# Patient Record
Sex: MALE | Race: WHITE | Employment: OTHER | URBAN - METROPOLITAN AREA
[De-identification: names, ages, dates, MRNs, and addresses within clinical notes are randomized per-mention and may not be internally consistent; named-entity substitution may affect disease eponyms.]

---

## 2017-01-12 ENCOUNTER — OFFICE VISIT (OUTPATIENT)
Dept: SURGERY | Age: 47
End: 2017-01-12

## 2017-01-12 VITALS
HEART RATE: 94 BPM | BODY MASS INDEX: 41.75 KG/M2 | DIASTOLIC BLOOD PRESSURE: 76 MMHG | RESPIRATION RATE: 18 BRPM | WEIGHT: 315 LBS | OXYGEN SATURATION: 97 % | SYSTOLIC BLOOD PRESSURE: 118 MMHG | TEMPERATURE: 98.4 F | HEIGHT: 73 IN

## 2017-01-12 DIAGNOSIS — R63.5 WEIGHT GAIN: ICD-10-CM

## 2017-01-12 DIAGNOSIS — E66.01 MORBID OBESITY WITH BMI OF 40.0-44.9, ADULT (HCC): Primary | ICD-10-CM

## 2017-01-12 RX ORDER — OMEPRAZOLE 40 MG/1
40 CAPSULE, DELAYED RELEASE ORAL DAILY
COMMUNITY

## 2017-01-12 RX ORDER — TRAMADOL HYDROCHLORIDE 50 MG/1
50 TABLET ORAL
COMMUNITY

## 2017-01-12 NOTE — PROGRESS NOTES
Chief Complaint   Patient presents with    Surgical Follow-up     4 years s/p sleeve gastrectomy by Dr Ronald Ya. down 58.5 pounds, gained  pounds. Patient is 4 years status post Sleeve gastrectomy for treatment of morbid obesity. Presents today with weigh gain and needing follow up bariatric care. He has not been in follow up for several years. He says he met his goal for surgery which was to have his hips replaced. He reports eating has \"not been great\" and the Holidays \"didn't help\". He has been snacking and eating cookies and \"whatever is quick\". He has resumed fast food and his portions are larger than they were. He is good about his vitamins. He recently started having a shake for breakfast vs \"grabbing a cookie\". Reflux better with Omeprazole   Has had hips replaced and having issues with right hip   Nereida 270 lbs (1st year post op)  Current weight since Holidays   Usually around 300 lbs        Co-Morbid(s)     Resolved      Was anti coagulation initiated for presumed / confirmed DVT/PE? NO    Was an incisional hernia noted on exam?       NO      COMORBIDITY     SLEEP APNEA                 NO        GERD  (req.meds)           YES  HYPERLIPIDEMIA           NO  HYPERTENSION             YES       IF YES, # OF HTN MEDICATIONS 1  DIABETES                        NO      IF YES, 0 NON-INSULIN   0 INSULIN       Visit Vitals    /76    Pulse 94    Temp 98.4 °F (36.9 °C)    Resp 18    Ht 6' 1\" (1.854 m)    Wt 318 lb 8 oz (144.5 kg)    SpO2 97%    BMI 42.02 kg/m2     A + O x 3  Chest  CTA  COR  RRR  ABD Soft, well healed lap sites, obese with grade II pannus, NT/ND, +BS, no masses or hernias. EXT No edema; ambulating independently      ICD-10-CM ICD-9-CM    1. Morbid obesity with BMI of 40.0-44.9, adult ([address] E66.01 278.01     Z68.41 V85.41    2.  Weight gain R63.5 783.1      4 years s/p Sleeve gastrectomy for treatment of morbid obesity    Almost 50 lbs weight regain from nereida weight Having trouble with right hip and weight gain contributing to pain   Reviewed vitamins and diet   Referred to RD for support / diet education   Reassured vitamins do not cause weight gain   Add vit D 5000 iu daily to regimen   Follow up in 3 months   Continue acid suppression for reflux and reduce portions   Adama Maldonado verbalized understanding and questions were answered to the best of my knowledge and ability. Diet  educational materials were provided. 16 minutes spent in face to face with Mariaa Mckay > 50% counseling.

## 2017-01-12 NOTE — MR AVS SNAPSHOT
Visit Information Date & Time Provider Department Dept. Phone Encounter #  
 1/12/2017 10:20 AM Ivet Turcios NP [facility] [address] [phone] [id number] Your Appointments 3/9/2017  9:20 AM  
ESTABLISHED PATIENT with Ivet Turcios NP  
[facility] [address] ([address]) Appt Note: EP 2 month F/U $0CP $0PB  
 [address] [place] [address] [facility] [id number]  
[address] [facility] Upcoming Health Maintenance Date Due DTaP/Tdap/Td series (1 - Tdap) 9/10/1989 INFLUENZA AGE 9 TO ADULT 8/1/2016 Allergies as of 1/12/2017  Review Complete On: 4/11/2013 By: Geraldine Vuong, RT Severity Noted Reaction Type Reactions Phenobarbital  08/15/2012   Side Effect Other (comments) Made him crazy Current Immunizations  Never Reviewed No immunizations on file. Not reviewed this visit Vitals BP Pulse Temp Resp Height(growth percentile) Weight(growth percentile) 118/76 94 98.4 °F (36.9 °C) 18 6' 1\" (1.854 m) 318 lb 8 oz (144.5 kg) SpO2 BMI Smoking Status 97% 42.02 kg/m2 Former Smoker Vitals History BMI and BSA Data Body Mass Index Body Surface Area 42.02 kg/m 2 2.73 m 2 Preferred Pharmacy Pharmacy Name Phone [id number] - [address] [phone] Your Updated Medication List  
  
   
This list is accurate as of: 1/12/17 11:34 AM.  Always use your most recent med list.  
  
  
  
  
 DIOVAN -12.5 mg per tablet Generic drug:  valsartan-hydroCHLOROthiazide Take 1 Tab by mouth daily. loratadine 10 mg tablet Commonly known as:  Napolean Cherokee Take 10 mg by mouth daily as needed. PriLOSEC 40 mg capsule Generic drug:  omeprazole Take 40 mg by mouth daily. SYNTHROID 125 mcg tablet Generic drug:  levothyroxine Take  by mouth daily. 250mcg daily  
  
 traMADol 50 mg tablet Commonly known as:  ULTRAM  
Take 50 mg by mouth every six (6) hours as needed for Pain. VITAMIN B-12 1,000 mcg sublingual tablet Generic drug:  cyanocobalamin 1 Tab by SubLINGual route daily. VITAMIN D3 1,000 unit tablet Generic drug:  cholecalciferol Take 5,000 Units by mouth daily. Patient Instructions Call and make an appointment with the dietician, please call or email Azam Barnett, [address] at: 
 
[phone] [email or web address] 
 
Vitamins: 
 
B12 500 mcg daily Multivitamin with iron tablet or chewable ([facility] makes a small tablet) Vit D 5000 iu daily Introducing [facility]! Yumiko Morrison introduces [employer] patient portal. Now you can access parts of your medical record, email your doctor's office, and request medication refills online. 1. In your internet browser, go to [email or web address]" 2. Click on the First Time User? Click Here link in the Sign In box. You will see the New Member Sign Up page. 3. Enter your [employer] Access Code exactly as it appears below. You will not need to use this code after youve completed the sign-up process. If you do not sign up before the expiration date, you must request a new code. · [employer] Access Code: [email or web address] Expires: 4/12/2017 11:34 AM 
 
4. Enter the last four digits of your Social Security Number (xxxx) and Date of Birth (mm/dd/yyyy) as indicated and click Submit. You will be taken to the next sign-up page. 5. Create a [employer] ID. This will be your [employer] login ID and cannot be changed, so think of one that is secure and easy to remember. 6. Create a [employer] password. You can change your password at any time. 7. Enter your Password Reset Question and Answer. This can be used at a later time if you forget your password. 8. Enter your e-mail address. You will receive e-mail notification when new information is available in [address] 9. Click Sign Up. You can now view and download portions of your medical record. 10. Click the Download Summary menu link to download a portable copy of your medical information. If you have questions, please visit the Frequently Asked Questions section of the [employer] website. Remember, [employer] is NOT to be used for urgent needs. For medical emergencies, dial 911. Now available from your iPhone and Android! Please provide this summary of care documentation to your next provider. Your primary care clinician is listed as Phys Other. If you have any questions after today's visit, please call [phone].

## 2017-01-12 NOTE — PATIENT INSTRUCTIONS
Call and make an appointment with the dietician, please call or email Lala Altman RD at:    [phone]  [email or web address]    Vitamins:    B12 500 mcg daily   Multivitamin with iron tablet or chewable ([facility] makes a small tablet)   Vit D 5000 iu daily

## 2017-01-12 NOTE — PROGRESS NOTES
1. Have you been to the ER, urgent care clinic since your last visit? Hospitalized since your last visit?  no    2. Have you seen or consulted any other health care providers outside of the [address] [place] [address] since your last visit? Include any pap smears or colon screening.    PCP

## 2017-10-09 ENCOUNTER — TELEPHONE (OUTPATIENT)
Dept: SURGERY | Age: 47
End: 2017-10-09

## 2018-10-08 ENCOUNTER — TELEPHONE (OUTPATIENT)
Dept: SURGERY | Age: 48
End: 2018-10-08

## 2019-10-07 ENCOUNTER — TELEPHONE (OUTPATIENT)
Dept: SURGERY | Age: 49
End: 2019-10-07

## 2020-10-07 ENCOUNTER — TELEPHONE (OUTPATIENT)
Dept: SURGERY | Age: 50
End: 2020-10-07

## 2022-04-20 ENCOUNTER — TRANSCRIBE ORDER (OUTPATIENT)
Dept: SCHEDULING | Age: 52
End: 2022-04-20

## 2022-04-20 DIAGNOSIS — Z96.649 S/P HIP REPLACEMENT: ICD-10-CM

## 2022-04-20 DIAGNOSIS — M25.552 LEFT HIP PAIN: Primary | ICD-10-CM

## 2022-04-21 ENCOUNTER — TRANSCRIBE ORDER (OUTPATIENT)
Dept: SCHEDULING | Age: 52
End: 2022-04-21

## 2022-04-21 DIAGNOSIS — M25.552 LEFT HIP PAIN: Primary | ICD-10-CM

## 2022-04-21 DIAGNOSIS — Z96.649 S/P HIP REPLACEMENT: ICD-10-CM

## 2022-05-04 ENCOUNTER — HOSPITAL ENCOUNTER (OUTPATIENT)
Dept: CT IMAGING | Age: 52
End: 2022-05-04

## 2022-05-04 ENCOUNTER — HOSPITAL ENCOUNTER (OUTPATIENT)
Dept: MRI IMAGING | Age: 52
End: 2022-05-04